# Patient Record
Sex: FEMALE | Race: WHITE | NOT HISPANIC OR LATINO | Employment: FULL TIME
[De-identification: names, ages, dates, MRNs, and addresses within clinical notes are randomized per-mention and may not be internally consistent; named-entity substitution may affect disease eponyms.]

---

## 2023-02-06 ENCOUNTER — TRANSCRIBE ORDERS (OUTPATIENT)
Dept: OTHER | Age: 73
End: 2023-02-06

## 2023-02-06 DIAGNOSIS — Z95.5 S/P CORONARY ARTERY STENT PLACEMENT: ICD-10-CM

## 2023-02-06 DIAGNOSIS — I25.10 CAD (CORONARY ARTERY DISEASE): Primary | ICD-10-CM

## 2023-02-14 ENCOUNTER — HOSPITAL ENCOUNTER (OUTPATIENT)
Dept: CARDIAC REHAB | Facility: CLINIC | Age: 73
Discharge: HOME | End: 2023-02-14
Attending: INTERNAL MEDICINE
Payer: COMMERCIAL

## 2023-02-14 PROCEDURE — 93798 PHYS/QHP OP CAR RHAB W/ECG: CPT | Performed by: REHABILITATION PRACTITIONER

## 2023-02-14 PROCEDURE — 93797 PHYS/QHP OP CAR RHAB WO ECG: CPT | Mod: 59 | Performed by: REHABILITATION PRACTITIONER

## 2023-02-17 ENCOUNTER — HOSPITAL ENCOUNTER (OUTPATIENT)
Dept: CARDIAC REHAB | Facility: CLINIC | Age: 73
Discharge: HOME | End: 2023-02-17
Attending: INTERNAL MEDICINE
Payer: COMMERCIAL

## 2023-02-17 PROCEDURE — 93798 PHYS/QHP OP CAR RHAB W/ECG: CPT | Performed by: OCCUPATIONAL THERAPIST

## 2023-02-21 ENCOUNTER — HOSPITAL ENCOUNTER (OUTPATIENT)
Dept: CARDIAC REHAB | Facility: CLINIC | Age: 73
Discharge: HOME | End: 2023-02-21
Attending: INTERNAL MEDICINE
Payer: COMMERCIAL

## 2023-02-21 PROCEDURE — 93798 PHYS/QHP OP CAR RHAB W/ECG: CPT | Performed by: REHABILITATION PRACTITIONER

## 2023-02-22 ENCOUNTER — HOSPITAL ENCOUNTER (OUTPATIENT)
Dept: CARDIAC REHAB | Facility: CLINIC | Age: 73
Discharge: HOME | End: 2023-02-22
Attending: INTERNAL MEDICINE
Payer: COMMERCIAL

## 2023-02-22 PROCEDURE — 93797 PHYS/QHP OP CAR RHAB WO ECG: CPT | Performed by: REHABILITATION PRACTITIONER

## 2023-02-22 PROCEDURE — 93798 PHYS/QHP OP CAR RHAB W/ECG: CPT | Performed by: REHABILITATION PRACTITIONER

## 2023-02-24 ENCOUNTER — HOSPITAL ENCOUNTER (OUTPATIENT)
Dept: CARDIAC REHAB | Facility: CLINIC | Age: 73
Discharge: HOME | End: 2023-02-24
Attending: INTERNAL MEDICINE
Payer: COMMERCIAL

## 2023-02-24 PROCEDURE — 93798 PHYS/QHP OP CAR RHAB W/ECG: CPT | Performed by: REHABILITATION PRACTITIONER

## 2023-02-27 ENCOUNTER — HOSPITAL ENCOUNTER (OUTPATIENT)
Dept: CARDIAC REHAB | Facility: CLINIC | Age: 73
Discharge: HOME | End: 2023-02-27
Attending: INTERNAL MEDICINE
Payer: COMMERCIAL

## 2023-02-27 PROCEDURE — 93798 PHYS/QHP OP CAR RHAB W/ECG: CPT | Performed by: REHABILITATION PRACTITIONER

## 2023-03-03 ENCOUNTER — HOSPITAL ENCOUNTER (OUTPATIENT)
Dept: CARDIAC REHAB | Facility: CLINIC | Age: 73
Discharge: HOME | End: 2023-03-03
Attending: INTERNAL MEDICINE
Payer: COMMERCIAL

## 2023-03-03 PROCEDURE — 93798 PHYS/QHP OP CAR RHAB W/ECG: CPT | Performed by: REHABILITATION PRACTITIONER

## 2023-03-09 ENCOUNTER — HOSPITAL ENCOUNTER (OUTPATIENT)
Dept: CARDIAC REHAB | Facility: CLINIC | Age: 73
Discharge: HOME | End: 2023-03-09
Attending: INTERNAL MEDICINE
Payer: COMMERCIAL

## 2023-03-09 PROCEDURE — 93798 PHYS/QHP OP CAR RHAB W/ECG: CPT | Performed by: REHABILITATION PRACTITIONER

## 2023-03-13 ENCOUNTER — HOSPITAL ENCOUNTER (OUTPATIENT)
Dept: CARDIAC REHAB | Facility: CLINIC | Age: 73
Discharge: HOME | End: 2023-03-13
Attending: INTERNAL MEDICINE
Payer: COMMERCIAL

## 2023-03-13 PROCEDURE — 93798 PHYS/QHP OP CAR RHAB W/ECG: CPT | Performed by: REHABILITATION PRACTITIONER

## 2023-03-15 ENCOUNTER — HOSPITAL ENCOUNTER (OUTPATIENT)
Dept: CARDIAC REHAB | Facility: CLINIC | Age: 73
Discharge: HOME | End: 2023-03-15
Attending: INTERNAL MEDICINE
Payer: COMMERCIAL

## 2023-03-15 PROCEDURE — 93798 PHYS/QHP OP CAR RHAB W/ECG: CPT

## 2023-03-21 ENCOUNTER — HOSPITAL ENCOUNTER (OUTPATIENT)
Dept: CARDIAC REHAB | Facility: CLINIC | Age: 73
Discharge: HOME | End: 2023-03-21
Attending: INTERNAL MEDICINE
Payer: COMMERCIAL

## 2023-03-21 PROCEDURE — 93798 PHYS/QHP OP CAR RHAB W/ECG: CPT | Performed by: OCCUPATIONAL THERAPIST

## 2023-04-22 ENCOUNTER — HEALTH MAINTENANCE LETTER (OUTPATIENT)
Age: 73
End: 2023-04-22

## 2023-05-09 ENCOUNTER — HOSPITAL ENCOUNTER (OUTPATIENT)
Dept: CARDIAC REHAB | Facility: CLINIC | Age: 73
Discharge: HOME | End: 2023-05-09
Attending: INTERNAL MEDICINE
Payer: COMMERCIAL

## 2023-05-09 PROCEDURE — 93798 PHYS/QHP OP CAR RHAB W/ECG: CPT

## 2023-05-11 ENCOUNTER — HOSPITAL ENCOUNTER (OUTPATIENT)
Dept: CARDIAC REHAB | Facility: CLINIC | Age: 73
Discharge: HOME | End: 2023-05-11
Attending: INTERNAL MEDICINE
Payer: COMMERCIAL

## 2023-05-11 PROCEDURE — 93798 PHYS/QHP OP CAR RHAB W/ECG: CPT

## 2023-06-15 ENCOUNTER — HOSPITAL ENCOUNTER (OUTPATIENT)
Facility: CLINIC | Age: 73
End: 2023-06-15
Attending: INTERNAL MEDICINE | Admitting: INTERNAL MEDICINE
Payer: COMMERCIAL

## 2023-06-26 ENCOUNTER — HOSPITAL ENCOUNTER (OUTPATIENT)
Facility: CLINIC | Age: 73
End: 2023-06-26
Attending: INTERNAL MEDICINE | Admitting: INTERNAL MEDICINE
Payer: COMMERCIAL

## 2023-06-26 RX ORDER — EZETIMIBE 10 MG/1
1 TABLET ORAL DAILY
COMMUNITY
Start: 2023-02-22 | End: 2023-07-03 | Stop reason: HOSPADM

## 2023-06-26 RX ORDER — NITROGLYCERIN 0.4 MG/1
0.4 TABLET SUBLINGUAL PRN
COMMUNITY
Start: 2022-04-18 | End: 2023-07-03 | Stop reason: HOSPADM

## 2023-06-26 RX ORDER — TIZANIDINE 2 MG/1
1-2 TABLET ORAL 2 TIMES DAILY PRN
COMMUNITY
Start: 2022-10-26 | End: 2023-07-03 | Stop reason: HOSPADM

## 2023-06-26 RX ORDER — CITALOPRAM HYDROBROMIDE 10 MG/1
10 TABLET ORAL DAILY
COMMUNITY
End: 2023-07-03 | Stop reason: HOSPADM

## 2023-06-26 RX ORDER — CLOPIDOGREL BISULFATE 75 MG/1
75 TABLET ORAL DAILY
COMMUNITY
Start: 2023-02-22 | End: 2023-07-03 | Stop reason: HOSPADM

## 2023-06-26 RX ORDER — DIAZEPAM 10 MG
10 TABLET ORAL DAILY
COMMUNITY
Start: 2022-10-27 | End: 2023-07-03 | Stop reason: HOSPADM

## 2023-06-26 RX ORDER — DIAZEPAM 5 MG
5-10 TABLET ORAL PRN
COMMUNITY
Start: 2023-03-14 | End: 2023-07-03 | Stop reason: HOSPADM

## 2023-06-26 RX ORDER — LETROZOLE 2.5 MG/1
2.5 TABLET, FILM COATED ORAL DAILY
COMMUNITY
Start: 2023-02-22 | End: 2023-07-03 | Stop reason: HOSPADM

## 2023-06-26 RX ORDER — GABAPENTIN 300 MG/1
300 CAPSULE ORAL 3 TIMES DAILY
COMMUNITY
Start: 2021-09-10 | End: 2023-07-03 | Stop reason: HOSPADM

## 2023-06-26 RX ORDER — NORTRIPTYLINE HCL 10 MG
20 CAPSULE ORAL AT BEDTIME
COMMUNITY
Start: 2021-07-05 | End: 2023-07-03 | Stop reason: HOSPADM

## 2023-06-26 RX ORDER — TOPIRAMATE 50 MG/1
50 TABLET, FILM COATED ORAL 2 TIMES DAILY
COMMUNITY
Start: 2023-03-14 | End: 2023-07-03 | Stop reason: HOSPADM

## 2023-06-26 RX ORDER — MELOXICAM 7.5 MG/1
7.5 TABLET ORAL
COMMUNITY
Start: 2023-03-28 | End: 2023-07-03 | Stop reason: HOSPADM

## 2023-06-26 RX ORDER — CARVEDILOL 3.12 MG/1
3.12 TABLET ORAL 2 TIMES DAILY WITH MEALS
COMMUNITY
Start: 2023-02-22 | End: 2023-07-03 | Stop reason: HOSPADM

## 2023-06-26 RX ORDER — DIAZEPAM 10 MG/1
1 TABLET ORAL PRN
COMMUNITY
End: 2023-07-03 | Stop reason: HOSPADM

## 2023-06-26 RX ORDER — ISOSORBIDE MONONITRATE 30 MG/1
30 TABLET, EXTENDED RELEASE ORAL DAILY
COMMUNITY
Start: 2023-05-30 | End: 2023-07-03 | Stop reason: HOSPADM

## 2023-06-26 RX ORDER — PANTOPRAZOLE SODIUM 40 MG/1
40 TABLET, DELAYED RELEASE ORAL DAILY
COMMUNITY
Start: 2023-05-23 | End: 2023-07-03 | Stop reason: HOSPADM

## 2023-06-26 RX ORDER — ATORVASTATIN CALCIUM 40 MG/1
40 TABLET, FILM COATED ORAL DAILY
COMMUNITY
Start: 2022-04-18 | End: 2023-07-03 | Stop reason: HOSPADM

## 2023-06-26 RX ORDER — DULOXETIN HYDROCHLORIDE 60 MG/1
60 CAPSULE, DELAYED RELEASE ORAL DAILY
COMMUNITY
Start: 2023-03-14 | End: 2023-07-03 | Stop reason: HOSPADM

## 2023-10-17 ENCOUNTER — HOSPITAL ENCOUNTER (EMERGENCY)
Facility: CLINIC | Age: 73
Discharge: HOME | End: 2023-10-18
Attending: EMERGENCY MEDICINE | Admitting: EMERGENCY MEDICINE
Payer: COMMERCIAL

## 2023-10-17 ENCOUNTER — APPOINTMENT (OUTPATIENT)
Dept: CT IMAGING | Facility: CLINIC | Age: 73
End: 2023-10-17
Attending: STUDENT IN AN ORGANIZED HEALTH CARE EDUCATION/TRAINING PROGRAM
Payer: COMMERCIAL

## 2023-10-17 DIAGNOSIS — R19.7 DIARRHEA: ICD-10-CM

## 2023-10-17 DIAGNOSIS — R11.0 NAUSEA: ICD-10-CM

## 2023-10-17 LAB
ALBUMIN SERPL BCG-MCNC: 3.9 G/DL (ref 3.5–5.2)
ALBUMIN UR-MCNC: 20 MG/DL
ALP SERPL-CCNC: 130 U/L (ref 35–104)
ALT SERPL W P-5'-P-CCNC: 30 U/L (ref 0–50)
ANION GAP SERPL CALCULATED.3IONS-SCNC: 12 MMOL/L (ref 7–15)
APPEARANCE UR: CLEAR
AST SERPL W P-5'-P-CCNC: 27 U/L (ref 0–45)
BASO+EOS+MONOS # BLD AUTO: ABNORMAL 10*3/UL
BASO+EOS+MONOS NFR BLD AUTO: ABNORMAL %
BASOPHILS # BLD AUTO: 0 10E3/UL (ref 0–0.2)
BASOPHILS NFR BLD AUTO: 0 %
BILIRUB SERPL-MCNC: 0.4 MG/DL
BILIRUB UR QL STRIP: NEGATIVE
BUN SERPL-MCNC: 18.7 MG/DL (ref 8–23)
CALCIUM SERPL-MCNC: 8.9 MG/DL (ref 8.8–10.2)
CHLORIDE SERPL-SCNC: 107 MMOL/L (ref 98–107)
COLOR UR AUTO: YELLOW
CREAT SERPL-MCNC: 0.81 MG/DL (ref 0.51–0.95)
DEPRECATED HCO3 PLAS-SCNC: 23 MMOL/L (ref 22–29)
EGFRCR SERPLBLD CKD-EPI 2021: 77 ML/MIN/1.73M2
EOSINOPHIL # BLD AUTO: 0.2 10E3/UL (ref 0–0.7)
EOSINOPHIL NFR BLD AUTO: 3 %
ERYTHROCYTE [DISTWIDTH] IN BLOOD BY AUTOMATED COUNT: 14.2 % (ref 10–15)
GLUCOSE SERPL-MCNC: 101 MG/DL (ref 70–99)
GLUCOSE UR STRIP-MCNC: NEGATIVE MG/DL
HCT VFR BLD AUTO: 46.3 % (ref 35–47)
HGB BLD-MCNC: 14.4 G/DL (ref 11.7–15.7)
HGB UR QL STRIP: NEGATIVE
HOLD SPECIMEN: NORMAL
HOLD SPECIMEN: NORMAL
IMM GRANULOCYTES # BLD: 0 10E3/UL
IMM GRANULOCYTES NFR BLD: 0 %
KETONES UR STRIP-MCNC: NEGATIVE MG/DL
LEUKOCYTE ESTERASE UR QL STRIP: ABNORMAL
LYMPHOCYTES # BLD AUTO: 1.6 10E3/UL (ref 0.8–5.3)
LYMPHOCYTES NFR BLD AUTO: 23 %
MCH RBC QN AUTO: 29.1 PG (ref 26.5–33)
MCHC RBC AUTO-ENTMCNC: 31.1 G/DL (ref 31.5–36.5)
MCV RBC AUTO: 94 FL (ref 78–100)
MONOCYTES # BLD AUTO: 0.9 10E3/UL (ref 0–1.3)
MONOCYTES NFR BLD AUTO: 12 %
MUCOUS THREADS #/AREA URNS LPF: PRESENT /LPF
NEUTROPHILS # BLD AUTO: 4.3 10E3/UL (ref 1.6–8.3)
NEUTROPHILS NFR BLD AUTO: 62 %
NITRATE UR QL: NEGATIVE
NRBC # BLD AUTO: 0 10E3/UL
NRBC BLD AUTO-RTO: 0 /100
PH UR STRIP: 5.5 [PH] (ref 5–7)
PLATELET # BLD AUTO: 274 10E3/UL (ref 150–450)
POTASSIUM SERPL-SCNC: 3.6 MMOL/L (ref 3.4–5.3)
PROT SERPL-MCNC: 7 G/DL (ref 6.4–8.3)
RBC # BLD AUTO: 4.95 10E6/UL (ref 3.8–5.2)
RBC URINE: 4 /HPF
SODIUM SERPL-SCNC: 142 MMOL/L (ref 135–145)
SP GR UR STRIP: 1.01 (ref 1–1.03)
SQUAMOUS EPITHELIAL: 1 /HPF
UROBILINOGEN UR STRIP-MCNC: NORMAL MG/DL
WBC # BLD AUTO: 7 10E3/UL (ref 4–11)
WBC URINE: 11 /HPF

## 2023-10-17 PROCEDURE — 81001 URINALYSIS AUTO W/SCOPE: CPT | Performed by: EMERGENCY MEDICINE

## 2023-10-17 PROCEDURE — 36415 COLL VENOUS BLD VENIPUNCTURE: CPT | Performed by: EMERGENCY MEDICINE

## 2023-10-17 PROCEDURE — 250N000011 HC RX IP 250 OP 636: Mod: JZ | Performed by: STUDENT IN AN ORGANIZED HEALTH CARE EDUCATION/TRAINING PROGRAM

## 2023-10-17 PROCEDURE — 96361 HYDRATE IV INFUSION ADD-ON: CPT

## 2023-10-17 PROCEDURE — 250N000009 HC RX 250: Performed by: EMERGENCY MEDICINE

## 2023-10-17 PROCEDURE — 258N000003 HC RX IP 258 OP 636: Performed by: EMERGENCY MEDICINE

## 2023-10-17 PROCEDURE — 80053 COMPREHEN METABOLIC PANEL: CPT | Performed by: EMERGENCY MEDICINE

## 2023-10-17 PROCEDURE — 74177 CT ABD & PELVIS W/CONTRAST: CPT

## 2023-10-17 PROCEDURE — 96374 THER/PROPH/DIAG INJ IV PUSH: CPT | Mod: 59

## 2023-10-17 PROCEDURE — 99285 EMERGENCY DEPT VISIT HI MDM: CPT | Mod: 25

## 2023-10-17 PROCEDURE — 250N000011 HC RX IP 250 OP 636: Performed by: EMERGENCY MEDICINE

## 2023-10-17 PROCEDURE — 85025 COMPLETE CBC W/AUTO DIFF WBC: CPT | Performed by: EMERGENCY MEDICINE

## 2023-10-17 PROCEDURE — 87086 URINE CULTURE/COLONY COUNT: CPT | Performed by: EMERGENCY MEDICINE

## 2023-10-17 RX ORDER — ONDANSETRON 2 MG/ML
4 INJECTION INTRAMUSCULAR; INTRAVENOUS EVERY 30 MIN PRN
Status: DISCONTINUED | OUTPATIENT
Start: 2023-10-17 | End: 2023-10-18 | Stop reason: HOSPADM

## 2023-10-17 RX ORDER — IOPAMIDOL 755 MG/ML
104 INJECTION, SOLUTION INTRAVASCULAR ONCE
Status: COMPLETED | OUTPATIENT
Start: 2023-10-17 | End: 2023-10-17

## 2023-10-17 RX ADMIN — IOPAMIDOL 104 ML: 755 INJECTION, SOLUTION INTRAVENOUS at 22:42

## 2023-10-17 RX ADMIN — SODIUM CHLORIDE 70 ML: 9 INJECTION, SOLUTION INTRAVENOUS at 22:43

## 2023-10-17 RX ADMIN — ONDANSETRON 4 MG: 2 INJECTION INTRAMUSCULAR; INTRAVENOUS at 21:48

## 2023-10-17 RX ADMIN — SODIUM CHLORIDE 500 ML: 9 INJECTION, SOLUTION INTRAVENOUS at 19:07

## 2023-10-17 ASSESSMENT — ENCOUNTER SYMPTOMS
CHILLS: 0
FEVER: 0
DIARRHEA: 1
RHINORRHEA: 0
ABDOMINAL PAIN: 1
DYSURIA: 0
NAUSEA: 1
DIZZINESS: 0
NUMBNESS: 0
HEADACHES: 0
FREQUENCY: 0
HEMATURIA: 0
VOMITING: 0

## 2023-10-17 ASSESSMENT — ACTIVITIES OF DAILY LIVING (ADL): ADLS_ACUITY_SCORE: 35

## 2023-10-17 NOTE — ED TRIAGE NOTES
Patient had had uncontrollable diarrhea for the past 3 days. States she also has had bad acid reflux. States has not been able to eat or drink anything. States pain across lower abdomen. Denies blood in stool or vomiting.      Triage Assessment (Adult)       Row Name 10/17/23 7684          Triage Assessment    Airway WDL WDL        Respiratory WDL    Respiratory WDL WDL        Skin Circulation/Temperature WDL    Skin Circulation/Temperature WDL WDL        Cardiac WDL    Cardiac WDL WDL        Peripheral/Neurovascular WDL    Peripheral Neurovascular WDL WDL        Cognitive/Neuro/Behavioral WDL    Cognitive/Neuro/Behavioral WDL WDL

## 2023-10-18 VITALS
WEIGHT: 208 LBS | SYSTOLIC BLOOD PRESSURE: 136 MMHG | HEIGHT: 66 IN | HEART RATE: 80 BPM | BODY MASS INDEX: 33.43 KG/M2 | RESPIRATION RATE: 16 BRPM | DIASTOLIC BLOOD PRESSURE: 78 MMHG | TEMPERATURE: 98.1 F | OXYGEN SATURATION: 95 %

## 2023-10-18 ASSESSMENT — ACTIVITIES OF DAILY LIVING (ADL): ADLS_ACUITY_SCORE: 35

## 2023-10-18 ASSESSMENT — ENCOUNTER SYMPTOMS
COUGH: 0
SHORTNESS OF BREATH: 0
BLOOD IN STOOL: 0

## 2023-10-18 NOTE — ED PROVIDER NOTES
I reviewed the CT scan and UA result with the patient.  UA does show a few white blood cells, but the patient does not have any dysuria, frequency, or urgency.  At this point, I felt it is more appropriate to wait for urine cultures as opposed to starting an antibiotic as the patient already has diarrhea.  Patient attempted to give a stool sample in the ER, but was unable to.  Patient was able to tolerate p.o. without difficulty.  She was discharged home with the patient to continue to push plenty of fluids and to provide a stool sample as an outpatient if she is able.  Patient was recommended follow the primary care doctor return to the ER for any further concerns     Walker Anthony MD  10/18/23 0112

## 2023-10-18 NOTE — DISCHARGE INSTRUCTIONS
Please complete stool studies at home and return to [facility] lab at your convenience.  These will determine if you require treatment for possible colonic infection.    Follow-up with your primary care provider in 1 week for reassessment.  Return to ER for any worsening symptoms.

## 2023-10-18 NOTE — ED PROVIDER NOTES
ED ATTENDING PHYSICIAN NOTE:   I evaluated this patient in conjunction with Marianne Moreau PA-C  I have participated in the care of the patient and personally performed key elements of the history, exam, and medical decision making.      HPI:   Ramos Richards is a 73 year old female with a history of C. Diff who presents with diarrhea that began 3 days ago. The patient notes that her episode are worse at night, reporting 10-15 episodes of foul smelling large volume mustard color stool.  Patient states that her stool is mostly liquid in nature.  No bloody stools or black stools. She states that it has been so severe that she wakes up covered in stool. She also notes of intermittent lower abdominal cramping that doesn't necessarily occur during episodes of diarrhea. The pain does not radiate. The patient states a recent UTI diagnosis on 9/28/23 in which she was prescribed Pyridium and Macrobid for her symptoms. She also reports a history of C. Diff diagnosed 10 years ago in conjunction with a perforated colon that required colon resection and a colostomy that has since been reversed. She is uncertain if her symptoms today feel similar to her prior C. Diff infection.      Review of Systems   Constitutional:  Negative for chills and fever.   HENT:  Negative for congestion and rhinorrhea.    Respiratory:  Negative for cough and shortness of breath.    Gastrointestinal:  Positive for abdominal pain, diarrhea and nausea. Negative for blood in stool and vomiting.   Genitourinary:  Negative for dysuria, frequency, hematuria and urgency.   Neurological:  Negative for dizziness, numbness and headaches.   All other systems reviewed and are negative.     Independent Historian:   None - Patient Only    Review of External Notes:   9/28/23 Telemedicine Note      EXAM:   Constitutional:       General: Not in acute distress.        Appearance: Normal appearance.   HENT:      Head: Normocephalic and atraumatic.   Eyes:      Extraocular  Movements: Extraocular movements intact.      Conjunctiva/sclera: Conjunctivae normal.   Cardiovascular:      Rate and Rhythm: Normal rate and regular rhythm.   Pulmonary:      Effort: Pulmonary effort is normal. No respiratory distress.      Breath sounds: Normal breath sounds.   Abdominal:      General: Abdomen is flat. There is no distension.      Palpations: Abdomen is soft.      Tenderness: There is mild midline of the lower abdominal/suprapubic tenderness to palpation.   Musculoskeletal:      Cervical back: Normal range of motion. No rigidity.      Right lower leg: No edema.      Left lower leg: No edema.   Skin:     General: Skin is warm and dry.   Neurological:      General: No focal deficit present.      Mental Status: Alert and oriented to person, place, and time.   Psychiatric:         Mood and Affect: Mood normal.         Behavior: Behavior normal.    Independent Interpretation (X-rays, CTs, rhythm strip):  None    Consultations/Discussion of Management or Tests:  ED Course as of 10/17/23 2311   e Oct 17, 2023   7291 I obtained history and examined the patient as noted above.         Social Determinants of Health affecting care:   None     MEDICAL DECISION MAKING/ASSESSMENT AND PLAN:   73-year-old female as described above presents to the emergency department for persistent diarrhea since Saturday with associated lower abdominal/suprapubic cramping.  Patient hemodynamically stable at time evaluation.  Afebrile.  Lower abdominal/suprapubic discomfort on examination.  Patient has not been able to produce stool sample in the ED.  Broad differential diagnosis considered includes, but not limited to, infectious colitis/C. difficile colitis, diverticulitis, bowel obstruction, and persistent urinary tract infection.  Abdominal pain lab work ordered.  Will obtain CT abdomen pelvis with contrast for evaluation of above discussed differentials.  Stool PCR.  The patient unable to produce sample in the ED, likely  sent home with outpatient stool collection kit.  No documented urine analysis on chart review.  Will obtain UA for evaluation for persistent UTI given suprapubic discomfort in addition to setting of frequent liquid diarrhea and patient waking up covered in stool putting her at risk for urinary tract infection.  Patient received IV fluid in triage and notes that she feels better as compared to prior to arrival.  Previously endorsing mild headache that has since resolved with IV fluid.  Abdominal lab work ordered from triage relatively unremarkable with no evidence of significant leukocytosis or DEANA.  If work-up negative, likely discharge to outpatient follow-up.  Discussed care plan with patient who voiced understanding and agreement with plan.  Answered all questions.  Additional work-up and orders as listed in chart.    Please refer to ED course above for details on the patient's treatment course and any changes or updates in care plan beyond my initial evaluation and MDM.     DIAGNOSIS:     ICD-10-CM    1. Diarrhea  R19.7       2. Nausea  R11.0                DISPOSITION:   Care of the patient was signed out to Dr. Anthony pending CT abdomen pelvis in addition to urine analysis.  Stool sample collection available, if not, discharge with outpatient stool collection kit.     Scribe Disclosure:  Shanon ARREDONDO, am serving as a scribe at 10:07 PM on 10/17/2023 to document services personally performed by Singh Jha DO based on my observations and the provider's statements to me.     10/17/2023  [facility] EMERGENCY DEPT       Singh Jha DO  10/18/23 0013

## 2023-10-18 NOTE — ED PROVIDER NOTES
History     Chief Complaint:  Diarrhea       HPI   Ramos Richards is a 73 year old female presents with frequent diarrhea.  Patient states that she has been having roughly 15-20 bowel movements a day since Saturday that have been pure liquid.  She states that she has been unable to even make it to the restroom during the night, having multiple accidents due to this.  Denies any recent antibiotic use, no recent travel, no known sick contacts.  She has been feeling nauseous however no vomiting.  Reduced oral intake due to ongoing symptoms.  She endorses suprapubic cramping however no true abdominal discomfort.  She states it has been quite sometime since she had a colonoscopy however does report history of partial colectomy, ileostomy, and known polyps.  Colonoscopy scheduled for this year in December.  Denies any blood in her stool or black discoloration to her stool.  She is currently on Plavix. Denies any urinary symptoms.       Independent Historian:   None - Patient Only    Review of External Notes:   Reviewed nurse triage note from today  Discharge summary from 10/10/2011 -  62 y/o female with history of perforated diverticulitis and 8/18/11 open sigmoid resection for diverticulitis developed anastomotic leak; to OR 8/25 when pt underwent exploratory laparotomy, abdominal washout, take-down of colorectal anastomosis and creation of end-colostomy. Pt was septic postop; BC from 8/27 BACTEROIDES OVATUS; ID follows, pt had been treated with Zosyn and Fluconazole-completed; switched to Augmentin 9/6, last day 9/8;   Clostridium difficile colitis: initially started on oral Flagyl 8/30 but it was switched to Vanco(as pt had severe nausea 2 to oral flagyl); on Vanco PO started 9/2 and Flagyl IV 9/8.  Flagyl IV stopped on 9/27/2011.      Medications:    No current outpatient medications on file.      Past Medical History:    No past medical history on file.    Past Surgical History:    No past surgical history on file.  "    Physical Exam   Patient Vitals for the past 24 hrs:   BP Temp Temp src Pulse Resp SpO2 Height Weight   10/17/23 1905 (!) 159/93 -- -- -- -- -- -- --   10/17/23 1857 (!) 150/107 98.1  F (36.7  C) Temporal 98 16 96 % 1.676 m (5' 6\") 94.3 kg (208 lb)        Physical Exam  General: Alert and cooperative with exam. Patient in no apparent distress. Normal mentation.  Head:  Scalp is NC/AT  Eyes:  No scleral icterus, PERRL  ENT:  The external nose and ears are normal.   Neck:  Normal range of motion without rigidity.  CV:  Regular rate and rhythm    No pathologic murmur   Resp:  Breath sounds are clear bilaterally    Non-labored, no retractions or accessory muscle use  GI:  Mild suprapubic tenderness to palpation.  All other quadrants are soft and nontender.  MS:  No lower extremity edema   Skin:  Warm and dry, No rash or lesions noted.  Neuro:  Oriented x 3. No gross motor deficits.      Emergency Department Course     Imaging:  Abd/pelvis CT,  IV  contrast only TRAUMA / AAA    (Results Pending)      Laboratory:  Labs Ordered and Resulted from Time of ED Arrival to Time of ED Departure   COMPREHENSIVE METABOLIC PANEL - Abnormal       Result Value    Sodium 142      Potassium 3.6      Carbon Dioxide (CO2) 23      Anion Gap 12      Urea Nitrogen 18.7      Creatinine 0.81      GFR Estimate 77      Calcium 8.9      Chloride 107      Glucose 101 (*)     Alkaline Phosphatase 130 (*)     AST 27      ALT 30      Protein Total 7.0      Albumin 3.9      Bilirubin Total 0.4     CBC WITH PLATELETS AND DIFFERENTIAL - Abnormal    WBC Count 7.0      RBC Count 4.95      Hemoglobin 14.4      Hematocrit 46.3      MCV 94      MCH 29.1      MCHC 31.1 (*)     RDW 14.2      Platelet Count 274      % Neutrophils 62      % Lymphocytes 23      % Monocytes 12      Mids % (Monos, Eos, Basos)        % Eosinophils 3      % Basophils 0      % Immature Granulocytes 0      NRBCs per 100 WBC 0      Absolute Neutrophils 4.3      Absolute Lymphocytes " 1.6      Absolute Monocytes 0.9      Mids Abs (Monos, Eos, Basos)        Absolute Eosinophils 0.2      Absolute Basophils 0.0      Absolute Immature Granulocytes 0.0      Absolute NRBCs 0.0     C. DIFFICILE TOXIN B PCR WITH REFLEX TO C. DIFFICILE ANTIGEN AND TOXINS A/B EIA   ENTERIC BACTERIA AND VIRUS PANEL BY PCR        Procedures   None    Emergency Department Course & Assessments:    Interventions:  Medications   ondansetron (ZOFRAN) injection 4 mg (4 mg Intravenous $Given 10/17/23 7332)   iopamidol (ISOVUE-370) solution 104 mL (has no administration in time range)   Saline Flush (has no administration in time range)   sodium chloride 0.9% BOLUS 500 mL (500 mLs Intravenous $New Bag 10/17/23 6699)      Independent Interpretation (X-rays, CTs, rhythm strip):  None    Consultations/Discussion of Management or Tests:  None        Social Determinants of Health affecting care:   None    Disposition:  Disposition pending CT results    Impression & Plan      Medical Decision Making:  Ramos Richards is a 73 year old female who presents with severe diarrhea with ongoing nausea since Saturday.  Reportedly having 10-20 bowel movements per day that are pure liquid.  She has not been on antibiotics recently, no medication changes, low suspicion for C. difficile however patient does have history of C. difficile in 2011 after diverticulitis perforation.  Patient was ordered stool studies to be completed at home and she was unable to provide sample while in the ED.  Otherwise labs are reassuring.  She has mild suprapubic tenderness on exam however all other quadrants are soft and nontender.  She is vitally stable, afebrile.  CT abdomen pelvis ordered to rule out diverticulitis, bowel obstruction, perforation, mesenteric ischemia, among other possible etiologies for patient's symptoms.  Patient's disposition pending CT results.  She was signed out to my colleague in the ED for ongoing work up.       Diagnosis:    ICD-10-CM    1.  Diarrhea  R19.7       2. Nausea  R11.0            Discharge Medications:  New Prescriptions    No medications on file          10/17/2023   GREGG Scott Lauren R, PA-C  10/17/23 0477

## 2023-10-19 LAB — BACTERIA UR CULT: NORMAL

## 2023-10-19 NOTE — RESULT ENCOUNTER NOTE
Final urine culture report is negative.  Adult Negative Urine culture parameters per protocol: Any # Urogenital single or mixed organism, <10,000 col/ml single organism (cath/midstream), and > 3 organisms (No susceptibilities performed).  [facility] Emergency Dept discharge antibiotic prescribed (If applicable): None  Treatment recommendations per [facility] ED Lab Result Urine Culture protocol.

## 2024-02-05 RX ORDER — EZETIMIBE 10 MG/1
10 TABLET ORAL DAILY
Qty: 90 TABLET | Refills: 3 | OUTPATIENT
Start: 2024-02-05

## 2024-02-05 RX ORDER — CLOPIDOGREL BISULFATE 75 MG/1
75 TABLET ORAL DAILY
Qty: 90 TABLET | Refills: 3 | OUTPATIENT
Start: 2024-02-05

## 2024-03-20 RX ORDER — EZETIMIBE 10 MG/1
10 TABLET ORAL DAILY
Qty: 90 TABLET | Refills: 3 | OUTPATIENT
Start: 2024-03-20

## 2024-05-24 ENCOUNTER — HOSPITAL ENCOUNTER (OUTPATIENT)
Facility: CLINIC | Age: 74
End: 2024-05-24
Attending: ORTHOPAEDIC SURGERY | Admitting: ORTHOPAEDIC SURGERY
Payer: COMMERCIAL